# Patient Record
Sex: MALE | Race: WHITE | NOT HISPANIC OR LATINO | URBAN - METROPOLITAN AREA
[De-identification: names, ages, dates, MRNs, and addresses within clinical notes are randomized per-mention and may not be internally consistent; named-entity substitution may affect disease eponyms.]

---

## 2017-08-02 ENCOUNTER — EMERGENCY (EMERGENCY)
Facility: HOSPITAL | Age: 27
LOS: 1 days | Discharge: HOME | End: 2017-08-02
Attending: EMERGENCY MEDICINE | Admitting: EMERGENCY MEDICINE
Payer: COMMERCIAL

## 2017-08-02 VITALS
OXYGEN SATURATION: 99 % | TEMPERATURE: 98 F | SYSTOLIC BLOOD PRESSURE: 122 MMHG | DIASTOLIC BLOOD PRESSURE: 74 MMHG | HEART RATE: 82 BPM | RESPIRATION RATE: 16 BRPM

## 2017-08-02 VITALS
DIASTOLIC BLOOD PRESSURE: 75 MMHG | WEIGHT: 205.03 LBS | SYSTOLIC BLOOD PRESSURE: 120 MMHG | OXYGEN SATURATION: 99 % | RESPIRATION RATE: 16 BRPM | HEIGHT: 67 IN | TEMPERATURE: 98 F | HEART RATE: 83 BPM

## 2017-08-02 LAB
ALBUMIN SERPL ELPH-MCNC: 4.6 G/DL — SIGNIFICANT CHANGE UP (ref 3.3–5)
ALP SERPL-CCNC: 86 U/L — SIGNIFICANT CHANGE UP (ref 30–120)
ALT FLD-CCNC: 17 U/L DA — SIGNIFICANT CHANGE UP (ref 10–60)
ANION GAP SERPL CALC-SCNC: 11 MMOL/L — SIGNIFICANT CHANGE UP (ref 5–17)
APPEARANCE UR: CLEAR — SIGNIFICANT CHANGE UP
AST SERPL-CCNC: 19 U/L — SIGNIFICANT CHANGE UP (ref 10–40)
BASOPHILS # BLD AUTO: 0 K/UL — SIGNIFICANT CHANGE UP (ref 0–0.2)
BASOPHILS NFR BLD AUTO: 0.5 % — SIGNIFICANT CHANGE UP (ref 0–2)
BILIRUB SERPL-MCNC: 0.6 MG/DL — SIGNIFICANT CHANGE UP (ref 0.2–1.2)
BILIRUB UR-MCNC: NEGATIVE — SIGNIFICANT CHANGE UP
BUN SERPL-MCNC: 15 MG/DL — SIGNIFICANT CHANGE UP (ref 7–23)
CALCIUM SERPL-MCNC: 9.1 MG/DL — SIGNIFICANT CHANGE UP (ref 8.4–10.5)
CHLORIDE SERPL-SCNC: 102 MMOL/L — SIGNIFICANT CHANGE UP (ref 96–108)
CK MB BLD-MCNC: 1 % — SIGNIFICANT CHANGE UP (ref 0–3.5)
CK MB CFR SERPL CALC: 1 NG/ML — SIGNIFICANT CHANGE UP (ref 0–3.6)
CK SERPL-CCNC: 101 U/L — SIGNIFICANT CHANGE UP (ref 39–308)
CO2 SERPL-SCNC: 26 MMOL/L — SIGNIFICANT CHANGE UP (ref 22–31)
COLOR SPEC: YELLOW — SIGNIFICANT CHANGE UP
CREAT SERPL-MCNC: 1.58 MG/DL — HIGH (ref 0.5–1.3)
DIFF PNL FLD: NEGATIVE — SIGNIFICANT CHANGE UP
EOSINOPHIL # BLD AUTO: 0 K/UL — SIGNIFICANT CHANGE UP (ref 0–0.5)
EOSINOPHIL NFR BLD AUTO: 0.1 % — SIGNIFICANT CHANGE UP (ref 0–6)
GLUCOSE SERPL-MCNC: 143 MG/DL — HIGH (ref 70–99)
GLUCOSE UR QL: NEGATIVE MG/DL — SIGNIFICANT CHANGE UP
HCT VFR BLD CALC: 50 % — SIGNIFICANT CHANGE UP (ref 39–50)
HGB BLD-MCNC: 17 G/DL — SIGNIFICANT CHANGE UP (ref 13–17)
KETONES UR-MCNC: ABNORMAL
LEUKOCYTE ESTERASE UR-ACNC: NEGATIVE — SIGNIFICANT CHANGE UP
LYMPHOCYTES # BLD AUTO: 1.4 K/UL — SIGNIFICANT CHANGE UP (ref 1–3.3)
LYMPHOCYTES # BLD AUTO: 16.9 % — SIGNIFICANT CHANGE UP (ref 13–44)
MCHC RBC-ENTMCNC: 28.2 PG — SIGNIFICANT CHANGE UP (ref 27–34)
MCHC RBC-ENTMCNC: 34 GM/DL — SIGNIFICANT CHANGE UP (ref 32–36)
MCV RBC AUTO: 82.8 FL — SIGNIFICANT CHANGE UP (ref 80–100)
MONOCYTES # BLD AUTO: 0.8 K/UL — SIGNIFICANT CHANGE UP (ref 0–0.9)
MONOCYTES NFR BLD AUTO: 10 % — SIGNIFICANT CHANGE UP (ref 2–14)
NEUTROPHILS # BLD AUTO: 6.2 K/UL — SIGNIFICANT CHANGE UP (ref 1.8–7.4)
NEUTROPHILS NFR BLD AUTO: 72.6 % — SIGNIFICANT CHANGE UP (ref 43–77)
NITRITE UR-MCNC: NEGATIVE — SIGNIFICANT CHANGE UP
PH UR: 5 — SIGNIFICANT CHANGE UP (ref 5–8)
PLATELET # BLD AUTO: 193 K/UL — SIGNIFICANT CHANGE UP (ref 150–400)
POTASSIUM SERPL-MCNC: 4.3 MMOL/L — SIGNIFICANT CHANGE UP (ref 3.5–5.3)
POTASSIUM SERPL-SCNC: 4.3 MMOL/L — SIGNIFICANT CHANGE UP (ref 3.5–5.3)
PROT SERPL-MCNC: 8.1 G/DL — SIGNIFICANT CHANGE UP (ref 6–8.3)
PROT UR-MCNC: 15 MG/DL
RBC # BLD: 6.03 M/UL — HIGH (ref 4.2–5.8)
RBC # FLD: 12.4 % — SIGNIFICANT CHANGE UP (ref 10.3–14.5)
SODIUM SERPL-SCNC: 139 MMOL/L — SIGNIFICANT CHANGE UP (ref 135–145)
SP GR SPEC: 1.02 — SIGNIFICANT CHANGE UP (ref 1.01–1.02)
TROPONIN I SERPL-MCNC: 0 NG/ML — LOW (ref 0.02–0.06)
UROBILINOGEN FLD QL: 1 MG/DL
WBC # BLD: 8.5 K/UL — SIGNIFICANT CHANGE UP (ref 3.8–10.5)
WBC # FLD AUTO: 8.5 K/UL — SIGNIFICANT CHANGE UP (ref 3.8–10.5)

## 2017-08-02 PROCEDURE — 99284 EMERGENCY DEPT VISIT MOD MDM: CPT

## 2017-08-02 PROCEDURE — 82553 CREATINE MB FRACTION: CPT

## 2017-08-02 PROCEDURE — 99284 EMERGENCY DEPT VISIT MOD MDM: CPT | Mod: 25

## 2017-08-02 PROCEDURE — 87040 BLOOD CULTURE FOR BACTERIA: CPT

## 2017-08-02 PROCEDURE — 82550 ASSAY OF CK (CPK): CPT

## 2017-08-02 PROCEDURE — 93005 ELECTROCARDIOGRAM TRACING: CPT

## 2017-08-02 PROCEDURE — 84484 ASSAY OF TROPONIN QUANT: CPT

## 2017-08-02 PROCEDURE — 80053 COMPREHEN METABOLIC PANEL: CPT

## 2017-08-02 PROCEDURE — 93010 ELECTROCARDIOGRAM REPORT: CPT

## 2017-08-02 PROCEDURE — 81001 URINALYSIS AUTO W/SCOPE: CPT

## 2017-08-02 PROCEDURE — 85027 COMPLETE CBC AUTOMATED: CPT

## 2017-08-02 RX ORDER — SODIUM CHLORIDE 9 MG/ML
1000 INJECTION INTRAMUSCULAR; INTRAVENOUS; SUBCUTANEOUS ONCE
Qty: 0 | Refills: 0 | Status: DISCONTINUED | OUTPATIENT
Start: 2017-08-02 | End: 2017-08-06

## 2017-08-02 RX ORDER — AMOXICILLIN 250 MG/5ML
1 SUSPENSION, RECONSTITUTED, ORAL (ML) ORAL
Qty: 0 | Refills: 0 | COMMUNITY

## 2017-08-02 RX ORDER — METOPROLOL TARTRATE 50 MG
1 TABLET ORAL
Qty: 0 | Refills: 0 | COMMUNITY

## 2017-08-02 NOTE — CONSULT NOTE ADULT - SUBJECTIVE AND OBJECTIVE BOX
History of Present Illness: The patient is a 27 year old male with no significant past medical history who presents with pre-syncope and palpitations. Over the past few weeks, he has had multiple episodes of dizziness, feeling like he will lose consciousness, heart racing, chest tightness. He states he has had multiple ED visits for this. He saw his PMD who referred to a cardiologist. He had an echo, stress test, and event monitor. All reportedly normal. Event monitor showed sinus tachycardia during symptoms. He was started on metoprolol succinate. He continued to have symptoms, but overall improved on metoprolol. He had a root canal yesterday.    Past Medical/Surgical History:  None    Medications:  Metoprolol succinate    Family History: Non-contributory family history of premature cardiovascular atherosclerotic disease    Social History: No tobacco, alcohol or drug use    Review of Systems:  General: No fevers, chills, weight loss or gain  Skin: No rashes, color changes  Cardiovascular: No chest pain, orthopnea  Respiratory: No shortness of breath, cough  Gastrointestinal: No nausea, abdominal pain  Genitourinary: No incontinence, pain with urination  Musculoskeletal: No pain, swelling, decreased range of motion  Neurological: No headache, weakness  Psychiatric: No depression, anxiety  Endocrine: No weight loss or gain, increased thirst  All other systems are comprehensively negative.    Physical Exam:  Vitals:        Vital Signs Last 24 Hrs  T(C): 36.9 (02 Aug 2017 08:48), Max: 36.9 (02 Aug 2017 08:48)  T(F): 98.5 (02 Aug 2017 08:48), Max: 98.5 (02 Aug 2017 08:48)  HR: 83 (02 Aug 2017 08:48) (83 - 83)  BP: 120/75 (02 Aug 2017 08:48) (120/75 - 120/75)  BP(mean): --  RR: 16 (02 Aug 2017 08:48) (16 - 16)  SpO2: 99% (02 Aug 2017 08:48) (99% - 99%)  General: NAD  Neck: No JVD, no carotid bruit  Lungs: CTAB  CV: RRR, nl S1/S2, no M/R/G  Abdomen: S/NT/ND, +BS  Extremities: No LE edema, no cyanosis    Labs:                        17.0   8.5   )-----------( 193      ( 02 Aug 2017 09:30 )             50.0     08-02    139  |  102  |  15  ----------------------------<  143<H>  4.3   |  26  |  1.58<H>    Ca    9.1      02 Aug 2017 09:30    TPro  8.1  /  Alb  4.6  /  TBili  0.6  /  DBili  x   /  AST  19  /  ALT  17  /  AlkPhos  86  08-02    CARDIAC MARKERS ( 02 Aug 2017 09:30 )  .000 ng/mL / x     / 101 U/L / x     / 1.0 ng/mL          ECG: NSR, normal axis, no ST abnormality

## 2017-08-02 NOTE — ED PROVIDER NOTE - OBJECTIVE STATEMENT
28 yo male c/o lightheadedness near syncope while driving to work today, here for evaluation.  Has been evaluation by a cardiologist in [place] a few weeks ago, had negative stress/echo and negative holter monitor. No chest pain, no shortness of breath.  Was started on metoprolol and on amoxicillin for a previous tooth infection. PMD Dr. Malone.

## 2017-08-02 NOTE — CONSULT NOTE ADULT - ASSESSMENT
The patient is a 27 year old male with no significant past medical history who presents with pre-syncope and palpitations.    Plan:  - ECG with no predisposing features for syncope/presyncope  - One troponin level negative  - Patient with extensive recent cardiac work-up that was essentially normal. Given the negative event monitoring with symptoms, cardiac arrhythmia is ruled-out as cause of these episodes  - No further cardiac testing indicated as inpatient

## 2017-08-07 LAB
CULTURE RESULTS: SIGNIFICANT CHANGE UP
SPECIMEN SOURCE: SIGNIFICANT CHANGE UP
